# Patient Record
Sex: FEMALE | Race: WHITE | NOT HISPANIC OR LATINO | Employment: UNEMPLOYED | URBAN - NONMETROPOLITAN AREA
[De-identification: names, ages, dates, MRNs, and addresses within clinical notes are randomized per-mention and may not be internally consistent; named-entity substitution may affect disease eponyms.]

---

## 2023-02-28 LAB
ANION GAP IN SER/PLAS: 10 MMOL/L (ref 10–20)
CALCIUM (MG/DL) IN SER/PLAS: 9.5 MG/DL (ref 8.6–10.3)
CARBON DIOXIDE, TOTAL (MMOL/L) IN SER/PLAS: 31 MMOL/L (ref 21–32)
CHLORIDE (MMOL/L) IN SER/PLAS: 103 MMOL/L (ref 98–107)
CREATININE (MG/DL) IN SER/PLAS: 0.78 MG/DL (ref 0.5–1.05)
GFR FEMALE: 85 ML/MIN/1.73M2
GLUCOSE (MG/DL) IN SER/PLAS: 80 MG/DL (ref 74–99)
POTASSIUM (MMOL/L) IN SER/PLAS: 4.3 MMOL/L (ref 3.5–5.3)
SODIUM (MMOL/L) IN SER/PLAS: 140 MMOL/L (ref 136–145)
UREA NITROGEN (MG/DL) IN SER/PLAS: 14 MG/DL (ref 6–23)

## 2023-09-19 PROBLEM — K21.9 GERD (GASTROESOPHAGEAL REFLUX DISEASE): Status: ACTIVE | Noted: 2023-09-19

## 2023-09-19 PROBLEM — R00.2 PALPITATIONS: Status: ACTIVE | Noted: 2023-09-19

## 2023-09-19 PROBLEM — R93.1 AGATSTON CORONARY ARTERY CALCIUM SCORE LESS THAN 100: Status: ACTIVE | Noted: 2023-09-19

## 2023-09-19 PROBLEM — E87.6 HYPOKALEMIA: Status: ACTIVE | Noted: 2023-09-19

## 2023-09-19 PROBLEM — E66.811 CLASS 1 OBESITY WITH BODY MASS INDEX (BMI) OF 30.0 TO 30.9 IN ADULT: Status: ACTIVE | Noted: 2023-09-19

## 2023-09-19 PROBLEM — E66.9 CLASS 1 OBESITY WITH BODY MASS INDEX (BMI) OF 30.0 TO 30.9 IN ADULT: Status: ACTIVE | Noted: 2023-09-19

## 2023-09-19 PROBLEM — R94.31 ABNORMAL EKG: Status: ACTIVE | Noted: 2023-09-19

## 2023-09-19 PROBLEM — D68.00 VON WILLEBRAND'S DISEASE (MULTI): Status: ACTIVE | Noted: 2023-09-19

## 2023-09-19 PROBLEM — I49.3 PVC (PREMATURE VENTRICULAR CONTRACTION): Status: ACTIVE | Noted: 2023-09-19

## 2023-09-19 PROBLEM — I10 PRIMARY HYPERTENSION: Status: ACTIVE | Noted: 2023-09-19

## 2023-09-19 PROBLEM — E78.5 HYPERLIPIDEMIA: Status: ACTIVE | Noted: 2023-09-19

## 2023-09-19 PROBLEM — I49.1 PAC (PREMATURE ATRIAL CONTRACTION): Status: ACTIVE | Noted: 2023-09-19

## 2023-09-19 PROBLEM — J45.909 ASTHMA (HHS-HCC): Status: ACTIVE | Noted: 2023-09-19

## 2023-09-19 RX ORDER — ALBUTEROL SULFATE 90 UG/1
1 AEROSOL, METERED RESPIRATORY (INHALATION) EVERY 4 HOURS PRN
COMMUNITY

## 2023-09-19 RX ORDER — LATANOPROST 50 UG/ML
1 SOLUTION/ DROPS OPHTHALMIC DAILY
COMMUNITY

## 2023-09-19 RX ORDER — SPIRONOLACTONE 25 MG/1
25 TABLET ORAL DAILY
COMMUNITY
End: 2024-01-24 | Stop reason: SDUPTHER

## 2023-09-19 RX ORDER — VIT C/E/ZN/COPPR/LUTEIN/ZEAXAN 250MG-90MG
CAPSULE ORAL
COMMUNITY

## 2023-09-19 RX ORDER — OMEPRAZOLE 20 MG/1
20 TABLET, DELAYED RELEASE ORAL
COMMUNITY
End: 2024-01-24 | Stop reason: ALTCHOICE

## 2023-09-19 RX ORDER — EPINEPHRINE 0.22MG
100 AEROSOL WITH ADAPTER (ML) INHALATION DAILY
COMMUNITY
End: 2024-01-24 | Stop reason: ALTCHOICE

## 2023-09-19 RX ORDER — ATORVASTATIN CALCIUM 40 MG/1
40 TABLET, FILM COATED ORAL NIGHTLY
COMMUNITY
End: 2024-01-24 | Stop reason: ALTCHOICE

## 2023-09-19 RX ORDER — CALCIUM CARBONATE 300MG(750)
400 TABLET,CHEWABLE ORAL DAILY
COMMUNITY

## 2023-09-19 RX ORDER — ATENOLOL 50 MG/1
50 TABLET ORAL DAILY
COMMUNITY

## 2023-09-19 RX ORDER — DESMOPRESSIN ACETATE 0.1 MG/1
0.1 TABLET ORAL AS NEEDED
COMMUNITY

## 2023-12-14 ENCOUNTER — APPOINTMENT (OUTPATIENT)
Dept: CARDIOLOGY | Facility: CLINIC | Age: 63
End: 2023-12-14
Payer: COMMERCIAL

## 2024-01-24 ENCOUNTER — OFFICE VISIT (OUTPATIENT)
Dept: CARDIOLOGY | Facility: CLINIC | Age: 64
End: 2024-01-24
Payer: COMMERCIAL

## 2024-01-24 VITALS
DIASTOLIC BLOOD PRESSURE: 84 MMHG | WEIGHT: 187 LBS | BODY MASS INDEX: 34.41 KG/M2 | HEIGHT: 62 IN | SYSTOLIC BLOOD PRESSURE: 126 MMHG | HEART RATE: 66 BPM

## 2024-01-24 DIAGNOSIS — R94.31 ABNORMAL EKG: ICD-10-CM

## 2024-01-24 DIAGNOSIS — R93.1 AGATSTON CORONARY ARTERY CALCIUM SCORE LESS THAN 100: ICD-10-CM

## 2024-01-24 DIAGNOSIS — I10 PRIMARY HYPERTENSION: ICD-10-CM

## 2024-01-24 DIAGNOSIS — E66.9 OBESITY (BMI 30.0-34.9): ICD-10-CM

## 2024-01-24 DIAGNOSIS — I49.1 PAC (PREMATURE ATRIAL CONTRACTION): ICD-10-CM

## 2024-01-24 DIAGNOSIS — R00.2 PALPITATIONS: Primary | ICD-10-CM

## 2024-01-24 DIAGNOSIS — E78.2 MIXED HYPERLIPIDEMIA: ICD-10-CM

## 2024-01-24 PROBLEM — E66.811 OBESITY (BMI 30.0-34.9): Status: ACTIVE | Noted: 2024-01-24

## 2024-01-24 PROBLEM — E66.811 CLASS 1 OBESITY WITH BODY MASS INDEX (BMI) OF 30.0 TO 30.9 IN ADULT: Status: RESOLVED | Noted: 2023-09-19 | Resolved: 2024-01-24

## 2024-01-24 PROCEDURE — 99214 OFFICE O/P EST MOD 30 MIN: CPT | Performed by: INTERNAL MEDICINE

## 2024-01-24 PROCEDURE — 3079F DIAST BP 80-89 MM HG: CPT | Performed by: INTERNAL MEDICINE

## 2024-01-24 PROCEDURE — 1036F TOBACCO NON-USER: CPT | Performed by: INTERNAL MEDICINE

## 2024-01-24 PROCEDURE — 3074F SYST BP LT 130 MM HG: CPT | Performed by: INTERNAL MEDICINE

## 2024-01-24 RX ORDER — LORATADINE 10 MG/1
10 TABLET ORAL DAILY
COMMUNITY

## 2024-01-24 RX ORDER — SPIRONOLACTONE 25 MG/1
25 TABLET ORAL DAILY
Qty: 90 TABLET | Refills: 3 | Status: SHIPPED | OUTPATIENT
Start: 2024-01-24

## 2024-01-24 ASSESSMENT — ENCOUNTER SYMPTOMS: DISTURBANCES IN COORDINATION: 1

## 2024-01-24 NOTE — PROGRESS NOTES
"Subjective   Lizbet Loera is a 63 y.o. female       Chief Complaint    Follow-up          HPI   Patient is here for follow-up continue management for recent evaluation for palpitation with documentation of few PACs and PVCs on the Holter monitor.  She was seen by Dr. Rae but because I saw her back in 2005 she elected to switch back.  She was placed on low-dose magnesium with improvement of her symptoms.  She described functional class I.  She denies lightheadedness, dizziness or syncope.  She remains fairly active.    Assessment    1.  Palpitation with documentation of minor PACs and PVCs markedly improved with the addition of low-dose magnesium  2. Hypertension-seems to be controlled      3.  Obesity with BMI of 34  4.  Mild asthma  5. GERD on prophylaxis  6. Possible migraine headaches  7. Von Willebrand's disease.  8. Hyperlipidemia  9..Low  coronary calcium score was 0 2/2023.     Recommendations:     1. Patient to continue to monitor her blood pressures at home,  2.  Advised her to remain active and to spend 30 minutes of exercise at least 3 to 5 days a week  3.  I encouraged her to lose weight  4.  I will see her back in 6 months  Review of Systems   Neurological:  Positive for disturbances in coordination.   All other systems reviewed and are negative.         Visit Vitals  /84 (BP Location: Left arm, Patient Position: Sitting)   Pulse 66   Ht 1.575 m (5' 2\")   Wt 84.8 kg (187 lb)   BMI 34.20 kg/m²   Smoking Status Never   BSA 1.93 m²        Objective   Physical Exam  Constitutional:       Appearance: Normal appearance. She is normal weight.   HENT:      Nose: Nose normal.   Neck:      Vascular: No carotid bruit.   Cardiovascular:      Rate and Rhythm: Normal rate.      Pulses: Normal pulses.      Heart sounds: Normal heart sounds.   Pulmonary:      Effort: Pulmonary effort is normal.   Abdominal:      General: Bowel sounds are normal.      Palpations: Abdomen is soft.   Genitourinary:     Rectum: " Normal.   Musculoskeletal:         General: Normal range of motion.      Cervical back: Normal range of motion.      Right lower leg: No edema.      Left lower leg: No edema.   Skin:     General: Skin is warm and dry.   Neurological:      General: No focal deficit present.      Mental Status: She is alert.   Psychiatric:         Mood and Affect: Mood normal.         Behavior: Behavior normal.         Thought Content: Thought content normal.         Judgment: Judgment normal.         Current Medications    Current Outpatient Medications:     albuterol (Proventil HFA) 90 mcg/actuation inhaler, Inhale 1 puff every 4 hours if needed., Disp: , Rfl:     atenolol (Tenormin) 50 mg tablet, Take 1 tablet (50 mg) by mouth once daily., Disp: , Rfl:     cholecalciferol (Vitamin D-3) 25 MCG (1000 UT) capsule, Take by mouth. Take as directed, Disp: , Rfl:     CRANBERRY FRUIT ORAL, Take by mouth. 475 mg caps. Take as directed, Disp: , Rfl:     desmopressin (DDAVP) 0.1 mg tablet, Take 1 tablet (0.1 mg) by mouth if needed., Disp: , Rfl:     latanoprost (Xalatan) 0.005 % ophthalmic solution, 1 drop once daily. Into affected eye(s) as directed, Disp: , Rfl:     loratadine (Claritin) 10 mg tablet, Take 1 tablet (10 mg) by mouth once daily., Disp: , Rfl:     magnesium oxide (Mag-Ox) 400 mg tablet, Take 1 tablet (400 mg) by mouth once daily., Disp: , Rfl:     MULTIVITAMIN ORAL, Take 1 tablet by mouth once daily., Disp: , Rfl:     ZINC ORAL, Take 50 mg by mouth once daily., Disp: , Rfl:     spironolactone (Aldactone) 25 mg tablet, Take 1 tablet (25 mg) by mouth once daily., Disp: 90 tablet, Rfl: 3           Scribe Attestation  By signing my name below, Charleen HOUSER LPN  , Scribe   attest that this documentation has been prepared under the direction and in the presence of Kita Harper MD.           Assessment/Plan   1. Palpitations  Follow Up In Cardiology    spironolactone (Aldactone) 25 mg tablet      2. Mixed hyperlipidemia         3. Abnormal EKG        4. Agatston coronary artery calcium score less than 100        5. PAC (premature atrial contraction)        6. Primary hypertension  Follow Up In Cardiology      7. Obesity (BMI 30.0-34.9)

## 2024-01-24 NOTE — LETTER
January 24, 2024     Sonja Dobbs MD  [address] [facility] [address]    Patient: Lizbet Loera   YOB: 1960   Date of Visit: 1/24/2024       Dear Dr. Sonja Dobbs MD:    Thank you for referring Lizbet Loera to me for evaluation. Below are my notes for this consultation.  If you have questions, please do not hesitate to call me. I look forward to following your patient along with you.       Sincerely,     Kita Harper MD      CC: No Recipients  ______________________________________________________________________________________    Subjective   Lizbet Loera is a 63 y.o. female       Chief Complaint    Follow-up          HPI   Patient is here for follow-up continue management for recent evaluation for palpitation with documentation of few PACs and PVCs on the Holter monitor.  She was seen by Dr. Rae but because I saw her back in 2005 she elected to switch back.  She was placed on low-dose magnesium with improvement of her symptoms.  She described functional class I.  She denies lightheadedness, dizziness or syncope.  She remains fairly active.    Assessment    1.  Palpitation with documentation of minor PACs and PVCs markedly improved with the addition of low-dose magnesium  2. Hypertension-seems to be controlled      3.  Obesity with BMI of 34  4.  Mild asthma  5. GERD on prophylaxis  6. Possible migraine headaches  7. Von Willebrand's disease.  8. Hyperlipidemia  9..Low  coronary calcium score was 0 2/2023.     Recommendations:     1. Patient to continue to monitor her blood pressures at home,  2.  Advised her to remain active and to spend 30 minutes of exercise at least 3 to 5 days a week  3.  I encouraged her to lose weight  4.  I will see her back in 6 months  Review of Systems   Neurological:  Positive for disturbances in coordination.   All other systems reviewed and are negative.         Visit Vitals  /84 (BP Location: Left arm, Patient Position: Sitting)   Pulse  "66   Ht 1.575 m (5' 2\")   Wt 84.8 kg (187 lb)   BMI 34.20 kg/m²   Smoking Status Never   BSA 1.93 m²        Objective   Physical Exam  Constitutional:       Appearance: Normal appearance. She is normal weight.   HENT:      Nose: Nose normal.   Neck:      Vascular: No carotid bruit.   Cardiovascular:      Rate and Rhythm: Normal rate.      Pulses: Normal pulses.      Heart sounds: Normal heart sounds.   Pulmonary:      Effort: Pulmonary effort is normal.   Abdominal:      General: Bowel sounds are normal.      Palpations: Abdomen is soft.   Genitourinary:     Rectum: Normal.   Musculoskeletal:         General: Normal range of motion.      Cervical back: Normal range of motion.      Right lower leg: No edema.      Left lower leg: No edema.   Skin:     General: Skin is warm and dry.   Neurological:      General: No focal deficit present.      Mental Status: She is alert.   Psychiatric:         Mood and Affect: Mood normal.         Behavior: Behavior normal.         Thought Content: Thought content normal.         Judgment: Judgment normal.         Current Medications    Current Outpatient Medications:   •  albuterol (Proventil HFA) 90 mcg/actuation inhaler, Inhale 1 puff every 4 hours if needed., Disp: , Rfl:   •  atenolol (Tenormin) 50 mg tablet, Take 1 tablet (50 mg) by mouth once daily., Disp: , Rfl:   •  cholecalciferol (Vitamin D-3) 25 MCG (1000 UT) capsule, Take by mouth. Take as directed, Disp: , Rfl:   •  CRANBERRY FRUIT ORAL, Take by mouth. 475 mg caps. Take as directed, Disp: , Rfl:   •  desmopressin (DDAVP) 0.1 mg tablet, Take 1 tablet (0.1 mg) by mouth if needed., Disp: , Rfl:   •  latanoprost (Xalatan) 0.005 % ophthalmic solution, 1 drop once daily. Into affected eye(s) as directed, Disp: , Rfl:   •  loratadine (Claritin) 10 mg tablet, Take 1 tablet (10 mg) by mouth once daily., Disp: , Rfl:   •  magnesium oxide (Mag-Ox) 400 mg tablet, Take 1 tablet (400 mg) by mouth once daily., Disp: , Rfl:   •  " MULTIVITAMIN ORAL, Take 1 tablet by mouth once daily., Disp: , Rfl:   •  ZINC ORAL, Take 50 mg by mouth once daily., Disp: , Rfl:   •  spironolactone (Aldactone) 25 mg tablet, Take 1 tablet (25 mg) by mouth once daily., Disp: 90 tablet, Rfl: 3           Scribe Attestation  By signing my name below, MIK Charleenjonnie GARAY LPN  , Scribe   attest that this documentation has been prepared under the direction and in the presence of Kita Harper MD.           Assessment/Plan   1. Palpitations  Follow Up In Cardiology    spironolactone (Aldactone) 25 mg tablet      2. Mixed hyperlipidemia        3. Abnormal EKG        4. Agatston coronary artery calcium score less than 100        5. PAC (premature atrial contraction)        6. Primary hypertension  Follow Up In Cardiology      7. Obesity (BMI 30.0-34.9)

## 2024-08-02 ENCOUNTER — APPOINTMENT (OUTPATIENT)
Dept: CARDIOLOGY | Facility: CLINIC | Age: 64
End: 2024-08-02
Payer: COMMERCIAL

## 2024-09-13 ENCOUNTER — TELEPHONE (OUTPATIENT)
Dept: CARDIOLOGY | Facility: CLINIC | Age: 64
End: 2024-09-13
Payer: COMMERCIAL

## 2024-09-27 ENCOUNTER — APPOINTMENT (OUTPATIENT)
Dept: CARDIOLOGY | Facility: CLINIC | Age: 64
End: 2024-09-27
Payer: COMMERCIAL

## 2025-03-05 ENCOUNTER — APPOINTMENT (OUTPATIENT)
Dept: CARDIOLOGY | Facility: CLINIC | Age: 65
End: 2025-03-05
Payer: COMMERCIAL

## 2025-09-18 ENCOUNTER — APPOINTMENT (OUTPATIENT)
Dept: CARDIOLOGY | Facility: CLINIC | Age: 65
End: 2025-09-18
Payer: COMMERCIAL